# Patient Record
Sex: FEMALE | NOT HISPANIC OR LATINO | Employment: UNEMPLOYED | URBAN - METROPOLITAN AREA
[De-identification: names, ages, dates, MRNs, and addresses within clinical notes are randomized per-mention and may not be internally consistent; named-entity substitution may affect disease eponyms.]

---

## 2022-05-03 ENCOUNTER — HOSPITAL ENCOUNTER (INPATIENT)
Facility: HOSPITAL | Age: 72
LOS: 1 days | Discharge: HOME | DRG: 280 | End: 2022-05-04
Attending: STUDENT IN AN ORGANIZED HEALTH CARE EDUCATION/TRAINING PROGRAM | Admitting: INTERNAL MEDICINE
Payer: MEDICARE

## 2022-05-03 DIAGNOSIS — T78.40XA ALLERGIC REACTION, INITIAL ENCOUNTER: Primary | ICD-10-CM

## 2022-05-03 DIAGNOSIS — I25.10 CAD (CORONARY ARTERY DISEASE): ICD-10-CM

## 2022-05-03 DIAGNOSIS — R06.02 SOB (SHORTNESS OF BREATH): ICD-10-CM

## 2022-05-03 PROBLEM — T50.8X5A CONTRAST MEDIA ADVERSE REACTION: Status: ACTIVE | Noted: 2022-05-03

## 2022-05-03 PROBLEM — Z91.041 CONTRAST MEDIA ALLERGY: Chronic | Status: ACTIVE | Noted: 2022-05-03

## 2022-05-03 PROBLEM — Z91.041 CONTRAST MEDIA ALLERGY: Status: ACTIVE | Noted: 2022-05-03

## 2022-05-03 LAB
ALBUMIN SERPL-MCNC: 2.5 GM/DL (ref 3.4–4.8)
ALBUMIN SERPL-MCNC: 3.8 GM/DL (ref 3.4–4.8)
ALBUMIN/GLOB SERPL: 1.3 RATIO (ref 1.1–2)
ALP SERPL-CCNC: 49 UNIT/L (ref 40–150)
ALT SERPL-CCNC: 13 UNIT/L (ref 0–55)
ANION GAP SERPL CALC-SCNC: 14 MEQ/L
AST SERPL-CCNC: 16 UNIT/L (ref 5–34)
BASOPHILS # BLD AUTO: 0.01 X10(3)/MCL (ref 0–0.2)
BASOPHILS NFR BLD AUTO: 0.1 %
BILIRUBIN DIRECT+TOT PNL SERPL-MCNC: 0.1 MG/DL (ref 0–0.5)
BILIRUBIN DIRECT+TOT PNL SERPL-MCNC: 0.2 MG/DL (ref 0–0.8)
BILIRUBIN DIRECT+TOT PNL SERPL-MCNC: 0.3 MG/DL
BNP BLD-MCNC: 84.7 PG/ML
BUN SERPL-MCNC: 13.1 MG/DL (ref 9.8–20.1)
BUN SERPL-MCNC: 9 MG/DL (ref 9.8–20.1)
CALCIUM SERPL-MCNC: 6.2 MG/DL (ref 8.4–10.2)
CALCIUM SERPL-MCNC: 9.4 MG/DL (ref 8.4–10.2)
CHLORIDE SERPL-SCNC: 104 MMOL/L (ref 98–107)
CHLORIDE SERPL-SCNC: 118 MMOL/L (ref 98–107)
CK MB SERPL-MCNC: 3.7 NG/ML
CK MB SERPL-MCNC: 4.5 NG/ML
CK SERPL-CCNC: 146 U/L (ref 29–168)
CK SERPL-CCNC: 148 U/L (ref 29–168)
CO2 SERPL-SCNC: 20 MMOL/L (ref 23–31)
CO2 SERPL-SCNC: 23 MMOL/L (ref 23–31)
CREAT SERPL-MCNC: 0.67 MG/DL (ref 0.55–1.02)
CREAT SERPL-MCNC: 1.14 MG/DL (ref 0.55–1.02)
CREAT UR-MCNC: 38.6 MG/DL (ref 47–110)
CREAT/UREA NIT SERPL: 11
DEPRECATED CALCIDIOL+CALCIFEROL SERPL-MC: 43.7 NG/ML (ref 30–80)
EOSINOPHIL # BLD AUTO: 0 X10(3)/MCL (ref 0–0.9)
EOSINOPHIL NFR BLD AUTO: 0 %
ERYTHROCYTE [DISTWIDTH] IN BLOOD BY AUTOMATED COUNT: 13.4 % (ref 11.5–17)
GLOBULIN SER-MCNC: 1.9 GM/DL (ref 2.4–3.5)
GLUCOSE SERPL-MCNC: 118 MG/DL (ref 82–115)
GLUCOSE SERPL-MCNC: 226 MG/DL (ref 82–115)
GLUCOSE SERPL-MCNC: 288 MG/DL (ref 70–110)
HCT VFR BLD AUTO: 35.5 % (ref 37–47)
HGB BLD-MCNC: 11.1 GM/DL (ref 12–16)
IMM GRANULOCYTES # BLD AUTO: 0.05 X10(3)/MCL (ref 0–0.02)
IMM GRANULOCYTES NFR BLD AUTO: 0.5 % (ref 0–0.43)
LYMPHOCYTES # BLD AUTO: 0.91 X10(3)/MCL (ref 0.6–4.6)
LYMPHOCYTES NFR BLD AUTO: 8.3 %
MAGNESIUM SERPL-MCNC: 2.3 MG/DL (ref 1.6–2.6)
MCH RBC QN AUTO: 31.6 PG (ref 27–31)
MCHC RBC AUTO-ENTMCNC: 31.3 MG/DL (ref 33–36)
MCV RBC AUTO: 101.1 FL (ref 80–94)
MONOCYTES # BLD AUTO: 0.35 X10(3)/MCL (ref 0.1–1.3)
MONOCYTES NFR BLD AUTO: 3.2 %
NEUTROPHILS # BLD AUTO: 9.7 X10(3)/MCL (ref 2.1–9.2)
NEUTROPHILS NFR BLD AUTO: 87.9 %
NRBC BLD AUTO-RTO: 0 %
PHOSPHATE SERPL-MCNC: 3 MG/DL (ref 2.3–4.7)
PLATELET # BLD AUTO: 169 X10(3)/MCL (ref 130–400)
PMV BLD AUTO: 10.3 FL (ref 9.4–12.4)
POCT GLUCOSE: 288 MG/DL (ref 70–110)
POTASSIUM SERPL-SCNC: 2.8 MMOL/L (ref 3.5–5.1)
POTASSIUM SERPL-SCNC: 4.3 MMOL/L (ref 3.5–5.1)
PROT SERPL-MCNC: 4.4 GM/DL (ref 5.8–7.6)
PROT UR STRIP-MCNC: 12.4 MG/DL
PROT/CREAT UR-RTO: 321.2 MG/GM CR
PTH-INTACT SERPL-MCNC: 62.7 PG/ML (ref 8.7–77)
RBC # BLD AUTO: 3.51 X10(6)/MCL (ref 4.2–5.4)
SODIUM SERPL-SCNC: 141 MMOL/L (ref 136–145)
SODIUM SERPL-SCNC: 146 MMOL/L (ref 136–145)
TROPONIN I SERPL-MCNC: 0.09 NG/ML (ref 0–0.04)
TROPONIN I SERPL-MCNC: 0.6 NG/ML (ref 0–0.04)
TROPONIN I SERPL-MCNC: 0.65 NG/ML (ref 0–0.04)
WBC # SPEC AUTO: 11 X10(3)/MCL (ref 4.5–11.5)

## 2022-05-03 PROCEDURE — 85025 COMPLETE CBC W/AUTO DIFF WBC: CPT | Performed by: STUDENT IN AN ORGANIZED HEALTH CARE EDUCATION/TRAINING PROGRAM

## 2022-05-03 PROCEDURE — 80069 RENAL FUNCTION PANEL: CPT | Performed by: INTERNAL MEDICINE

## 2022-05-03 PROCEDURE — 25000003 PHARM REV CODE 250: Performed by: STUDENT IN AN ORGANIZED HEALTH CARE EDUCATION/TRAINING PROGRAM

## 2022-05-03 PROCEDURE — 36415 COLL VENOUS BLD VENIPUNCTURE: CPT | Performed by: STUDENT IN AN ORGANIZED HEALTH CARE EDUCATION/TRAINING PROGRAM

## 2022-05-03 PROCEDURE — 93010 ELECTROCARDIOGRAM REPORT: CPT | Mod: ,,, | Performed by: INTERNAL MEDICINE

## 2022-05-03 PROCEDURE — 11000001 HC ACUTE MED/SURG PRIVATE ROOM

## 2022-05-03 PROCEDURE — 93005 ELECTROCARDIOGRAM TRACING: CPT

## 2022-05-03 PROCEDURE — 84484 ASSAY OF TROPONIN QUANT: CPT | Performed by: INTERNAL MEDICINE

## 2022-05-03 PROCEDURE — 84484 ASSAY OF TROPONIN QUANT: CPT | Performed by: STUDENT IN AN ORGANIZED HEALTH CARE EDUCATION/TRAINING PROGRAM

## 2022-05-03 PROCEDURE — 25000003 PHARM REV CODE 250: Performed by: INTERNAL MEDICINE

## 2022-05-03 PROCEDURE — 82306 VITAMIN D 25 HYDROXY: CPT | Performed by: INTERNAL MEDICINE

## 2022-05-03 PROCEDURE — 25000242 PHARM REV CODE 250 ALT 637 W/ HCPCS: Performed by: STUDENT IN AN ORGANIZED HEALTH CARE EDUCATION/TRAINING PROGRAM

## 2022-05-03 PROCEDURE — 83880 ASSAY OF NATRIURETIC PEPTIDE: CPT | Performed by: STUDENT IN AN ORGANIZED HEALTH CARE EDUCATION/TRAINING PROGRAM

## 2022-05-03 PROCEDURE — 94640 AIRWAY INHALATION TREATMENT: CPT

## 2022-05-03 PROCEDURE — 63600175 PHARM REV CODE 636 W HCPCS: Performed by: INTERNAL MEDICINE

## 2022-05-03 PROCEDURE — 63600175 PHARM REV CODE 636 W HCPCS: Performed by: STUDENT IN AN ORGANIZED HEALTH CARE EDUCATION/TRAINING PROGRAM

## 2022-05-03 PROCEDURE — 82553 CREATINE MB FRACTION: CPT | Performed by: INTERNAL MEDICINE

## 2022-05-03 PROCEDURE — 83735 ASSAY OF MAGNESIUM: CPT | Performed by: INTERNAL MEDICINE

## 2022-05-03 PROCEDURE — 99285 EMERGENCY DEPT VISIT HI MDM: CPT | Mod: 25

## 2022-05-03 PROCEDURE — 83970 ASSAY OF PARATHORMONE: CPT | Performed by: INTERNAL MEDICINE

## 2022-05-03 PROCEDURE — 82570 ASSAY OF URINE CREATININE: CPT | Performed by: INTERNAL MEDICINE

## 2022-05-03 PROCEDURE — 80053 COMPREHEN METABOLIC PANEL: CPT | Performed by: STUDENT IN AN ORGANIZED HEALTH CARE EDUCATION/TRAINING PROGRAM

## 2022-05-03 PROCEDURE — 93010 EKG 12-LEAD: ICD-10-PCS | Mod: ,,, | Performed by: INTERNAL MEDICINE

## 2022-05-03 PROCEDURE — 82550 ASSAY OF CK (CPK): CPT | Performed by: INTERNAL MEDICINE

## 2022-05-03 RX ORDER — AMLODIPINE BESYLATE 5 MG/1
10 TABLET ORAL DAILY
Status: DISCONTINUED | OUTPATIENT
Start: 2022-05-04 | End: 2022-05-04 | Stop reason: HOSPADM

## 2022-05-03 RX ORDER — METHYLPREDNISOLONE SOD SUCC 125 MG
80 VIAL (EA) INJECTION EVERY 6 HOURS
Status: DISCONTINUED | OUTPATIENT
Start: 2022-05-03 | End: 2022-05-03

## 2022-05-03 RX ORDER — CARVEDILOL 25 MG/1
25 TABLET ORAL 2 TIMES DAILY WITH MEALS
COMMUNITY

## 2022-05-03 RX ORDER — TRAZODONE HYDROCHLORIDE 50 MG/1
50 TABLET ORAL NIGHTLY
Status: DISCONTINUED | OUTPATIENT
Start: 2022-05-03 | End: 2022-05-04 | Stop reason: HOSPADM

## 2022-05-03 RX ORDER — PANTOPRAZOLE SODIUM 40 MG/1
40 TABLET, DELAYED RELEASE ORAL DAILY
Status: DISCONTINUED | OUTPATIENT
Start: 2022-05-04 | End: 2022-05-04 | Stop reason: HOSPADM

## 2022-05-03 RX ORDER — INSULIN ASPART 100 [IU]/ML
0-5 INJECTION, SOLUTION INTRAVENOUS; SUBCUTANEOUS
Status: DISCONTINUED | OUTPATIENT
Start: 2022-05-03 | End: 2022-05-04 | Stop reason: HOSPADM

## 2022-05-03 RX ORDER — NITROGLYCERIN 0.4 MG/1
0.4 TABLET SUBLINGUAL EVERY 5 MIN PRN
COMMUNITY

## 2022-05-03 RX ORDER — IBUPROFEN 200 MG
24 TABLET ORAL
Status: DISCONTINUED | OUTPATIENT
Start: 2022-05-03 | End: 2022-05-04 | Stop reason: HOSPADM

## 2022-05-03 RX ORDER — ASPIRIN 81 MG/1
81 TABLET ORAL DAILY
Status: DISCONTINUED | OUTPATIENT
Start: 2022-05-04 | End: 2022-05-04 | Stop reason: HOSPADM

## 2022-05-03 RX ORDER — ENOXAPARIN SODIUM 100 MG/ML
40 INJECTION SUBCUTANEOUS
Status: DISCONTINUED | OUTPATIENT
Start: 2022-05-04 | End: 2022-05-03

## 2022-05-03 RX ORDER — MAGNESIUM SULFATE HEPTAHYDRATE 40 MG/ML
2 INJECTION, SOLUTION INTRAVENOUS
Status: COMPLETED | OUTPATIENT
Start: 2022-05-03 | End: 2022-05-03

## 2022-05-03 RX ORDER — CLOPIDOGREL BISULFATE 75 MG/1
75 TABLET ORAL DAILY
COMMUNITY

## 2022-05-03 RX ORDER — FUROSEMIDE 10 MG/ML
20 INJECTION INTRAMUSCULAR; INTRAVENOUS
Status: COMPLETED | OUTPATIENT
Start: 2022-05-03 | End: 2022-05-03

## 2022-05-03 RX ORDER — ENOXAPARIN SODIUM 100 MG/ML
1 INJECTION SUBCUTANEOUS
Status: DISCONTINUED | OUTPATIENT
Start: 2022-05-03 | End: 2022-05-03

## 2022-05-03 RX ORDER — IPRATROPIUM BROMIDE AND ALBUTEROL SULFATE 2.5; .5 MG/3ML; MG/3ML
3 SOLUTION RESPIRATORY (INHALATION)
Status: DISPENSED | OUTPATIENT
Start: 2022-05-03 | End: 2022-05-04

## 2022-05-03 RX ORDER — POTASSIUM CHLORIDE 20 MEQ/1
40 TABLET, EXTENDED RELEASE ORAL ONCE
Status: COMPLETED | OUTPATIENT
Start: 2022-05-03 | End: 2022-05-03

## 2022-05-03 RX ORDER — DIPHENHYDRAMINE HYDROCHLORIDE 50 MG/ML
12.5 INJECTION INTRAMUSCULAR; INTRAVENOUS EVERY 6 HOURS
Status: DISCONTINUED | OUTPATIENT
Start: 2022-05-03 | End: 2022-05-03

## 2022-05-03 RX ORDER — ATORVASTATIN CALCIUM 80 MG/1
80 TABLET, FILM COATED ORAL DAILY
COMMUNITY

## 2022-05-03 RX ORDER — ASPIRIN 81 MG/1
81 TABLET ORAL DAILY
COMMUNITY

## 2022-05-03 RX ORDER — ENOXAPARIN SODIUM 100 MG/ML
1 INJECTION SUBCUTANEOUS
Status: DISCONTINUED | OUTPATIENT
Start: 2022-05-04 | End: 2022-05-04 | Stop reason: HOSPADM

## 2022-05-03 RX ORDER — ATORVASTATIN CALCIUM 40 MG/1
80 TABLET, FILM COATED ORAL DAILY
Status: DISCONTINUED | OUTPATIENT
Start: 2022-05-04 | End: 2022-05-04 | Stop reason: HOSPADM

## 2022-05-03 RX ORDER — IPRATROPIUM BROMIDE AND ALBUTEROL SULFATE 2.5; .5 MG/3ML; MG/3ML
3 SOLUTION RESPIRATORY (INHALATION) ONCE
Status: COMPLETED | OUTPATIENT
Start: 2022-05-03 | End: 2022-05-03

## 2022-05-03 RX ORDER — POTASSIUM CHLORIDE 14.9 MG/ML
40 INJECTION INTRAVENOUS ONCE
Status: COMPLETED | OUTPATIENT
Start: 2022-05-03 | End: 2022-05-03

## 2022-05-03 RX ORDER — POTASSIUM CHLORIDE 14.9 MG/ML
20 INJECTION INTRAVENOUS ONCE
Status: DISCONTINUED | OUTPATIENT
Start: 2022-05-03 | End: 2022-05-03

## 2022-05-03 RX ORDER — HYDROGEN PEROXIDE 3 %
20 SOLUTION, NON-ORAL MISCELLANEOUS
COMMUNITY

## 2022-05-03 RX ORDER — CARVEDILOL 12.5 MG/1
25 TABLET ORAL 2 TIMES DAILY WITH MEALS
Status: DISCONTINUED | OUTPATIENT
Start: 2022-05-03 | End: 2022-05-03

## 2022-05-03 RX ORDER — GLUCAGON 1 MG
1 KIT INJECTION
Status: DISCONTINUED | OUTPATIENT
Start: 2022-05-03 | End: 2022-05-04 | Stop reason: HOSPADM

## 2022-05-03 RX ORDER — TRAZODONE HYDROCHLORIDE 50 MG/1
50 TABLET ORAL NIGHTLY
COMMUNITY

## 2022-05-03 RX ORDER — METFORMIN HYDROCHLORIDE 500 MG/1
500 TABLET ORAL
COMMUNITY

## 2022-05-03 RX ORDER — EZETIMIBE 10 MG/1
10 TABLET ORAL DAILY
Status: DISCONTINUED | OUTPATIENT
Start: 2022-05-04 | End: 2022-05-04 | Stop reason: HOSPADM

## 2022-05-03 RX ORDER — ERGOCALCIFEROL 1.25 MG/1
50000 CAPSULE ORAL
COMMUNITY

## 2022-05-03 RX ORDER — CLOPIDOGREL BISULFATE 75 MG/1
75 TABLET ORAL DAILY
Status: DISCONTINUED | OUTPATIENT
Start: 2022-05-04 | End: 2022-05-04 | Stop reason: HOSPADM

## 2022-05-03 RX ORDER — IBUPROFEN 200 MG
16 TABLET ORAL
Status: DISCONTINUED | OUTPATIENT
Start: 2022-05-03 | End: 2022-05-04 | Stop reason: HOSPADM

## 2022-05-03 RX ORDER — LISINOPRIL AND HYDROCHLOROTHIAZIDE 12.5; 2 MG/1; MG/1
1 TABLET ORAL 2 TIMES DAILY
COMMUNITY

## 2022-05-03 RX ORDER — AMLODIPINE BESYLATE 10 MG/1
10 TABLET ORAL DAILY
COMMUNITY

## 2022-05-03 RX ORDER — LEVOTHYROXINE SODIUM 137 UG/1
1 CAPSULE ORAL DAILY
Status: DISCONTINUED | OUTPATIENT
Start: 2022-05-04 | End: 2022-05-03

## 2022-05-03 RX ORDER — LEVOTHYROXINE SODIUM 137 UG/1
1 CAPSULE ORAL DAILY
COMMUNITY

## 2022-05-03 RX ORDER — EZETIMIBE 10 MG/1
10 TABLET ORAL DAILY
COMMUNITY

## 2022-05-03 RX ADMIN — TRAZODONE HYDROCHLORIDE 50 MG: 50 TABLET ORAL at 10:05

## 2022-05-03 RX ADMIN — ENOXAPARIN SODIUM 60 MG: 100 INJECTION SUBCUTANEOUS at 01:05

## 2022-05-03 RX ADMIN — POTASSIUM CHLORIDE 40 MEQ: 200 INJECTION, SOLUTION INTRAVENOUS at 03:05

## 2022-05-03 RX ADMIN — CALCIUM GLUCONATE 1 G: 98 INJECTION, SOLUTION INTRAVENOUS at 02:05

## 2022-05-03 RX ADMIN — POTASSIUM CHLORIDE 40 MEQ: 1500 TABLET, EXTENDED RELEASE ORAL at 08:05

## 2022-05-03 RX ADMIN — POTASSIUM CHLORIDE 40 MEQ: 20 TABLET, EXTENDED RELEASE ORAL at 01:05

## 2022-05-03 RX ADMIN — FUROSEMIDE 20 MG: 10 INJECTION, SOLUTION INTRAMUSCULAR; INTRAVENOUS at 01:05

## 2022-05-03 RX ADMIN — MAGNESIUM SULFATE 2 G: 2 INJECTION INTRAVENOUS at 01:05

## 2022-05-03 RX ADMIN — METHYLPREDNISOLONE SODIUM SUCCINATE 80 MG: 40 INJECTION, POWDER, FOR SOLUTION INTRAMUSCULAR; INTRAVENOUS at 10:05

## 2022-05-03 RX ADMIN — IPRATROPIUM BROMIDE AND ALBUTEROL SULFATE 3 ML: .5; 3 SOLUTION RESPIRATORY (INHALATION) at 01:05

## 2022-05-03 NOTE — ED PROVIDER NOTES
Encounter Date: 5/3/2022    SCRIBE #1 NOTE: I, Soo Marcos, am scribing for, and in the presence of,  Flaco Raza MD. I have scribed the following portions of the note - the EKG reading. Other sections scribed: HPI, ROS, and physical examination.       History     Chief Complaint   Patient presents with    Allergic Reaction     Pt was having allergic reaction to contrast.  Pt given Valium 5mg, Versed 1mg, Fentanyl 50mcg, Pepcid 20mg, Benadryl 100mg, Solumedrol 250mg, Epi x 1, Atropine x 1, Nitro 900mcg, Zofran 8mg - vital signs were stable when EMS arrived.     Hypotension     Pt was having cardiac cath - was given dye and went bradycardic, hypotensive.      71 y.o. female presents to ED via EMS from [facility] after having a possible allergic reaction to dye during heart cath procedure. Per EMS report pt became hypotensive and bradycardic after dye was administered. She was given Valium 5 mg, Versed 1 mg, Fentanyl 50 mcg, Pepcid 20 mg, Benadryl 100 mg, Solumedrol 250 mg, epi x 1, atropine x 1, NTG, and Zofran. Vitals stable upon EMS arrival. Pt reports having symptoms of chest pain, SOB, nausea, and vomiting since episode occurred.    The history is provided by the patient and the EMS personnel. No  was used.   Allergic Reaction  The primary symptoms are  shortness of breath, nausea and vomiting. Primary symptoms comment: bradycardic, hypotensive. The current episode started 1 to 2 hours ago. The problem has been rapidly improving.   The shortness of breath began today. The shortness of breath developed suddenly.   Associated with: being given dye during heart cath procedure.     Review of patient's allergies indicates:   Allergen Reactions    Iodinated contrast media Other (See Comments)     Became bradycardic, hypotensive during angiogram contrast administration.     Past Medical History:   Diagnosis Date    Coronary artery disease     Diabetes mellitus     Hypertension     ST elevation  (STEMI) myocardial infarction of unspecified site      History reviewed. No pertinent surgical history.  History reviewed. No pertinent family history.     Review of Systems   Constitutional: Negative.    HENT: Negative.    Eyes: Negative.    Respiratory: Positive for shortness of breath.    Cardiovascular: Positive for chest pain.   Gastrointestinal: Positive for nausea and vomiting.   Endocrine: Negative.    Genitourinary: Negative.    Musculoskeletal: Negative.    Skin: Negative.    Allergic/Immunologic:        Allergic reaction to contrast dye   Neurological: Negative.    Hematological: Negative.    Psychiatric/Behavioral: Negative.    All other systems reviewed and are negative.      Physical Exam     Initial Vitals [05/03/22 1015]   BP Pulse Resp Temp SpO2   (!) 130/51 89 18 98.8 °F (37.1 °C) 100 %      MAP       --         Physical Exam    Nursing note and vitals reviewed.  Constitutional: She appears well-developed. She does not appear ill. No distress.   HENT:   Head: Normocephalic and atraumatic.   Mouth/Throat: Oropharynx is clear and moist.   Eyes: Conjunctivae and EOM are normal.   Neck: Neck supple.   Normal range of motion.  Cardiovascular: Normal rate and regular rhythm.   No murmur heard.  Arterial line to RUE   Pulmonary/Chest: Breath sounds normal. No respiratory distress. She exhibits no tenderness.   Abdominal: Abdomen is soft. Bowel sounds are normal. She exhibits no distension. There is no abdominal tenderness.   No right CVA tenderness.  No left CVA tenderness.   Musculoskeletal:         General: Normal range of motion.      Cervical back: Normal range of motion and neck supple.      Lumbar back: Normal. Normal range of motion.     Neurological: She is alert and oriented to person, place, and time. She has normal strength. No cranial nerve deficit or sensory deficit.   Psychiatric: She has a normal mood and affect. Her mood appears not anxious.         ED Course   Procedures  Labs Reviewed    COMPREHENSIVE METABOLIC PANEL - Abnormal; Notable for the following components:       Result Value    Sodium Level 146 (*)     Potassium Level 2.8 (*)     Chloride 118 (*)     Carbon Dioxide 20 (*)     Glucose Level 118 (*)     Blood Urea Nitrogen 9.0 (*)     Calcium Level Total 6.2 (*)     Protein Total 4.4 (*)     Albumin Level 2.5 (*)     Globulin 1.9 (*)     All other components within normal limits   TROPONIN I - Abnormal; Notable for the following components:    Troponin-I 0.089 (*)     All other components within normal limits   CBC WITH DIFFERENTIAL - Abnormal; Notable for the following components:    RBC 3.51 (*)     Hgb 11.1 (*)     Hct 35.5 (*)     .1 (*)     MCH 31.6 (*)     MCHC 31.3 (*)     Neut # 9.7 (*)     IG# 0.05 (*)     IG% 0.5 (*)     All other components within normal limits   B-TYPE NATRIURETIC PEPTIDE - Normal   CBC W/ AUTO DIFFERENTIAL    Narrative:     The following orders were created for panel order CBC auto differential.  Procedure                               Abnormality         Status                     ---------                               -----------         ------                     CBC with Differential[[id number]]        Abnormal            Final result                 Please view results for these tests on the individual orders.     EKG Readings: (Independently Interpreted)   Initial Reading: No STEMI. Rhythm: Normal Sinus Rhythm. Heart Rate: 89. Ectopy: No Ectopy. Conduction: Normal. ST Segments: Normal ST Segments. T Waves: Normal. Axis: Normal.   EKG performed at 1017 on 5/3/22     ECG Results          EKG 12-lead (Final result)  Result time 05/03/22 16:53:08    Final result by Interface, Lab In [facility] (05/03/22 16:53:08)                 Narrative:    Test Reason : I25.10,    Vent. Rate : 089 BPM     Atrial Rate : 089 BPM     P-R Int : 178 ms          QRS Dur : 078 ms      QT Int : 396 ms       P-R-T Axes : 079 073 079 degrees     QTc Int : 481 ms    Normal sinus  rhythm  Normal ECG  Confirmed by Porter Diaz MD ([id number]) on 5/3/2022 4:53:01 PM    Referred By: KAREN PEÑA           Confirmed By:Porter Diaz MD                             EKG 12-LEAD (Final result)  Result time 05/19/22 14:10:26    Final result by Unknown User (05/19/22 14:10:26)                                Imaging Results          X-Ray Chest 1 View (Final result)  Result time 05/03/22 13:16:07    Final result by Sam Day MD (05/03/22 13:16:07)                 Impression:      NO ACUTE CARDIOPULMONARY PROCESS IDENTIFIED.      Electronically signed by: Sam Day  Date:    05/03/2022  Time:    13:16             Narrative:    EXAMINATION:  XR CHEST 1 VIEW    CLINICAL HISTORY:  Shortness of breath    COMPARISON:  None available.    FINDINGS:  Cardiopericardial silhouette is within normal limits. Lungs are without dense focal or segmental consolidation, congestive process, pleural effusions or pneumothorax.                                 Medications   albuterol-ipratropium 2.5 mg-0.5 mg/3 mL nebulizer solution 3 mL ( Nebulization Canceled Entry 5/3/22 1245)   albuterol-ipratropium 2.5 mg-0.5 mg/3 mL nebulizer solution 3 mL (3 mLs Nebulization Given 5/3/22 1337)   furosemide injection 20 mg (20 mg Intravenous Given 5/3/22 1359)   magnesium sulfate 2g in water 50mL IVPB (premix) (0 g Intravenous Stopped 5/3/22 1559)   potassium chloride SA CR tablet 40 mEq (40 mEq Oral Given 5/3/22 1359)   potassium chloride 20 mEq in 100 mL IVPB (FOR CENTRAL LINE ADMINISTRATION ONLY) ( Intravenous Rate/Dose Change 5/3/22 1600)   calcium gluconate 1 g in sodium chloride 0.9 % 100 mL IVPB (MB+) (0 g Intravenous Stopped 5/3/22 1519)   potassium chloride SA CR tablet 40 mEq (40 mEq Oral Given 5/3/22 2002)              Scribe Attestation:   Scribe #1: I performed the above scribed service and the documentation accurately describes the services I performed. I attest to the accuracy of the note.        ED Course as  of 05/19/22 1748   Tue May 03, 2022   1256 Spoke with Dr. Gregor Camacho.  Recommend Lovenox 1 per kg, Solu-Medrol Q 6, Benadryl q.6.  Will admit. [RP]   1258 All results discussed with patient.  Patient remains on 3 L nasal cannula.  Still has wheezing.  Will give additional DuoNeb.  Will admit for continued observation.  Patient verbalized understanding agreed to plan. [RP]      ED Course User Index  [RP] Flaco Raza MD            I, Flaco Raza MD, personally performed the services described in the documentation as documented by the scribe in my presence and is both accurate and complete.       Discussed with medicine for admission.      Clinical Impression:   Final diagnoses:  [R06.02] SOB (shortness of breath)  [I25.10] CAD (coronary artery disease)  [T78.40XA] Allergic reaction, initial encounter (Primary)          ED Disposition Condition    Admit               Flaco Raza MD  05/19/22 1744

## 2022-05-03 NOTE — HPI
This is a  yo AAF with a h/o CAD/PTCA/MI/PAD, well known to me and having atypical anginal symptoms despite a normal NMST.  While undergoing LHC at the out-patient cath lab, it appeared that patient developed a severe contrast dye allergy where coronary flow drastically slowed down after the first couple injections of contrast during the angiogram.  She has a history of contrast dye allergy and was premedicated overnight and today for the procedure.  During the angiogram, she developed severe cp, SOB and ischemic EKG changes as well as hypotension and bradycardia.  She was quickly resuscitated using NTG, Epi and Atropine.  She was also administered another round of IV Solumedrol.  Transferred via ambulance to the ED for further eval and management.  Currently, she is feeling mostly back to normal and chest pain free but still with some SOB and O2 at 2 l/m.

## 2022-05-03 NOTE — ED NOTES
Pt presents to ED from outpatient facility while having heart cath. Per [facility] patient became bradycardic and hypotensive after receiving contrast intravenously. See triage note for mulitple medications given. Pt AAOx4 at this time, denies pain. O2 89% on room air, put on 3L NC, up to 93%. Denies needs, placed on monitor, grandson at bedside, side rails up x2, cb within reach, nicolasa schultz

## 2022-05-03 NOTE — SUBJECTIVE & OBJECTIVE
No past medical history on file.    No past surgical history on file.    Review of patient's allergies indicates:   Allergen Reactions    Iodinated contrast media Other (See Comments)     Became bradycardic, hypotensive during angiogram contrast administration.       No current facility-administered medications on file prior to encounter.     Current Outpatient Medications on File Prior to Encounter   Medication Sig    amLODIPine (NORVASC) 10 MG tablet Take 10 mg by mouth once daily.    aspirin (ECOTRIN) 81 MG EC tablet Take 81 mg by mouth once daily.    atorvastatin (LIPITOR) 80 MG tablet Take 80 mg by mouth once daily.    carvediloL (COREG) 25 MG tablet Take 25 mg by mouth 2 (two) times daily with meals.    clopidogreL (PLAVIX) 75 mg tablet Take 75 mg by mouth once daily.    ergocalciferol (VITAMIN D2) 50,000 unit Cap Take 50,000 Units by mouth twice a week.    esomeprazole (NEXIUM) 20 MG capsule Take 20 mg by mouth before breakfast.    ezetimibe (ZETIA) 10 mg tablet Take 10 mg by mouth once daily.    levothyroxine (TIROSINT) 137 mcg Cap Take 1 tablet by mouth once daily at 6am.    linaCLOtide (LINZESS) 145 mcg Cap capsule Take 145 mcg by mouth before breakfast.    lisinopriL-hydrochlorothiazide (PRINZIDE,ZESTORETIC) 20-12.5 mg per tablet Take 1 tablet by mouth 2 (two) times a day.    metFORMIN (GLUCOPHAGE) 500 MG tablet Take 500 mg by mouth daily with breakfast.    traZODone (DESYREL) 50 MG tablet Take 50 mg by mouth every evening.    nitroGLYCERIN (NITROSTAT) 0.4 MG SL tablet Place 0.4 mg under the tongue every 5 (five) minutes as needed for Chest pain.     Family History    None       Tobacco Use    Smoking status: Not on file    Smokeless tobacco: Not on file   Substance and Sexual Activity    Alcohol use: Not on file    Drug use: Not on file    Sexual activity: Not on file     Review of Systems   Cardiovascular:  Positive for chest pain.   Respiratory:  Positive for shortness of breath.    All other systems  reviewed and are negative.  Objective:     Vital Signs (Most Recent):  Temp: 98.8 °F (37.1 °C) (05/03/22 1015)  Pulse: (!) 52 (05/03/22 1620)  Resp: 20 (05/03/22 1620)  BP: 130/72 (05/03/22 1620)  SpO2: 96 % (05/03/22 1620)   Vital Signs (24h Range):  Temp:  [98.8 °F (37.1 °C)] 98.8 °F (37.1 °C)  Pulse:  [52-89] 52  Resp:  [15-22] 20  SpO2:  [93 %-100 %] 96 %  BP: (126-145)/(51-75) 130/72     Weight: 63.5 kg (139 lb 15.9 oz)  Body mass index is 23.3 kg/m².    SpO2: 96 %  O2 Device (Oxygen Therapy): nasal cannula      Intake/Output Summary (Last 24 hours) at 5/3/2022 1801  Last data filed at 5/3/2022 1616  Gross per 24 hour   Intake --   Output 1000 ml   Net -1000 ml       Lines/Drains/Airways       Arterial Line  Duration             Arterial Line 05/03/22 1115 Right Radial <1 day              Peripheral Intravenous Line  Duration                  Peripheral IV - Single Lumen 05/03/22 1115 22 G Left Hand <1 day         Peripheral IV - Single Lumen 05/03/22 1130 20 G Left Antecubital <1 day                    Physical Exam  Cardiovascular:      Rate and Rhythm: Regular rhythm.      Pulses: Normal pulses.      Heart sounds: Normal heart sounds.       Significant Labs: All pertinent lab results from the last 24 hours have been reviewed. and   Recent Lab Results         05/03/22  1603   05/03/22  1130        Abs Lymph   0.91       Abs Neutro   9.7       Albumin   2.5       Albumin/Globulin Ratio   1.3       Alkaline Phosphatase   49       ALT   13       AST   16       Baso #   0.01       Basophil %   0.1       Bilirubin, Direct   0.1       Bilirubin, Indirect   0.20       BILIRUBIN TOTAL   0.3       BNP   84.7       BUN   9.0       Calcium   6.2  Comment: Critical Result called and verified by verbal readback to: Hamida Guardado on 05/03/2022 at 12:56. Reported by [id number].       Chloride   118       CO2   20       Creatinine   0.67       Creatinine, Urine 38.6         eGFR if    >60       eGFR if non     >60       Eos #   0.00       Eosinophil %   0.0       Globulin, Total   1.9       Glucose   118       Hematocrit   35.5       Hemoglobin   11.1       Immature Grans (Abs)   0.05       Immature Granulocytes   0.5       Lymph Auto   8.3       MCH   31.6       MCHC   31.3       MCV   101.1       Mono #   0.35       Mono %   3.2       MPV   10.3       Neutrophils Relative   87.9       nRBC   0.0       Platelets   169       Potassium   2.8       Prot/Creat Ratio, Urine 321.2         PROTEIN TOTAL   4.4       Protein, Urine Random 12.4         RBC   3.51       RDW   13.4       Sodium   146       Troponin I   0.089       WBC   11.0               Significant Imaging: EKG: NSR, NL ST segments currently and no ischemic EKG changes. and X-Ray: CXR: X-Ray Chest 1 View (CXR):   Results for orders placed or performed during the hospital encounter of 05/03/22   X-Ray Chest 1 View    Narrative    EXAMINATION:  XR CHEST 1 VIEW    CLINICAL HISTORY:  Shortness of breath    COMPARISON:  None available.    FINDINGS:  Cardiopericardial silhouette is within normal limits. Lungs are without dense focal or segmental consolidation, congestive process, pleural effusions or pneumothorax.      Impression    NO ACUTE CARDIOPULMONARY PROCESS IDENTIFIED.      Electronically signed by: Sam Day  Date:    05/03/2022  Time:    13:16

## 2022-05-03 NOTE — CONSULTS
[facility] - 9th Floor Med Surg  Cardiology  Consult Note    Patient Name: Dian Malik  MRN: [id number]  Admission Date: 5/3/2022  Hospital Length of Stay: 0 days  Code Status: No Order   Attending Provider: Eddy Raza MD   Consulting Provider: Gregor Rosales DO  Primary Care Physician: Rachna Domingo MD  Principal Problem:Contrast media adverse reaction    Patient information was obtained from patient and ER records.     Inpatient consult to Cardiology  Consult performed by: Gregor Camacho DO  Consult ordered by: Flaco Raza MD        Subjective:     Chief Complaint:  Chest pain.      HPI:   This is a  yo AAF with a h/o CAD/PTCA/MI/PAD, well known to me and having atypical anginal symptoms despite a normal NMST.  While undergoing LHC at the out-patient cath lab, it appeared that patient developed a severe contrast dye allergy where coronary flow drastically slowed down after the first couple injections of contrast during the angiogram.  She has a history of contrast dye allergy and was premedicated overnight and today for the procedure.  During the angiogram, she developed severe cp, SOB and ischemic EKG changes as well as hypotension and bradycardia.  She was quickly resuscitated using NTG, Epi and Atropine.  She was also administered another round of IV Solumedrol.  Transferred via ambulance to the ED for further eval and management.  Currently, she is feeling mostly back to normal and chest pain free but still with some SOB and O2 at 2 l/m.         No past medical history on file.    No past surgical history on file.    Review of patient's allergies indicates:   Allergen Reactions    Iodinated contrast media Other (See Comments)     Became bradycardic, hypotensive during angiogram contrast administration.       No current facility-administered medications on file prior to encounter.     Current Outpatient Medications on File Prior to Encounter   Medication Sig    amLODIPine (NORVASC)  10 MG tablet Take 10 mg by mouth once daily.    aspirin (ECOTRIN) 81 MG EC tablet Take 81 mg by mouth once daily.    atorvastatin (LIPITOR) 80 MG tablet Take 80 mg by mouth once daily.    carvediloL (COREG) 25 MG tablet Take 25 mg by mouth 2 (two) times daily with meals.    clopidogreL (PLAVIX) 75 mg tablet Take 75 mg by mouth once daily.    ergocalciferol (VITAMIN D2) 50,000 unit Cap Take 50,000 Units by mouth twice a week.    esomeprazole (NEXIUM) 20 MG capsule Take 20 mg by mouth before breakfast.    ezetimibe (ZETIA) 10 mg tablet Take 10 mg by mouth once daily.    levothyroxine (TIROSINT) 137 mcg Cap Take 1 tablet by mouth once daily at 6am.    linaCLOtide (LINZESS) 145 mcg Cap capsule Take 145 mcg by mouth before breakfast.    lisinopriL-hydrochlorothiazide (PRINZIDE,ZESTORETIC) 20-12.5 mg per tablet Take 1 tablet by mouth 2 (two) times a day.    metFORMIN (GLUCOPHAGE) 500 MG tablet Take 500 mg by mouth daily with breakfast.    traZODone (DESYREL) 50 MG tablet Take 50 mg by mouth every evening.    nitroGLYCERIN (NITROSTAT) 0.4 MG SL tablet Place 0.4 mg under the tongue every 5 (five) minutes as needed for Chest pain.     Family History    None       Tobacco Use    Smoking status: Not on file    Smokeless tobacco: Not on file   Substance and Sexual Activity    Alcohol use: Not on file    Drug use: Not on file    Sexual activity: Not on file     Review of Systems   Cardiovascular:  Positive for chest pain.   Respiratory:  Positive for shortness of breath.    All other systems reviewed and are negative.  Objective:     Vital Signs (Most Recent):  Temp: 98.8 °F (37.1 °C) (05/03/22 1015)  Pulse: (!) 52 (05/03/22 1620)  Resp: 20 (05/03/22 1620)  BP: 130/72 (05/03/22 1620)  SpO2: 96 % (05/03/22 1620)   Vital Signs (24h Range):  Temp:  [98.8 °F (37.1 °C)] 98.8 °F (37.1 °C)  Pulse:  [52-89] 52  Resp:  [15-22] 20  SpO2:  [93 %-100 %] 96 %  BP: (126-145)/(51-75) 130/72     Weight: 63.5 kg (139 lb 15.9  oz)  Body mass index is 23.3 kg/m².    SpO2: 96 %  O2 Device (Oxygen Therapy): nasal cannula      Intake/Output Summary (Last 24 hours) at 5/3/2022 1801  Last data filed at 5/3/2022 1616  Gross per 24 hour   Intake --   Output 1000 ml   Net -1000 ml       Lines/Drains/Airways       Arterial Line  Duration             Arterial Line 05/03/22 1115 Right Radial <1 day              Peripheral Intravenous Line  Duration                  Peripheral IV - Single Lumen 05/03/22 1115 22 G Left Hand <1 day         Peripheral IV - Single Lumen 05/03/22 1130 20 G Left Antecubital <1 day                    Physical Exam  Cardiovascular:      Rate and Rhythm: Regular rhythm.      Pulses: Normal pulses.      Heart sounds: Normal heart sounds.       Significant Labs: All pertinent lab results from the last 24 hours have been reviewed. and   Recent Lab Results         05/03/22  1603   05/03/22  1130        Abs Lymph   0.91       Abs Neutro   9.7       Albumin   2.5       Albumin/Globulin Ratio   1.3       Alkaline Phosphatase   49       ALT   13       AST   16       Baso #   0.01       Basophil %   0.1       Bilirubin, Direct   0.1       Bilirubin, Indirect   0.20       BILIRUBIN TOTAL   0.3       BNP   84.7       BUN   9.0       Calcium   6.2  Comment: Critical Result called and verified by verbal readback to: Hamida Guardado on 05/03/2022 at 12:56. Reported by [id number].       Chloride   118       CO2   20       Creatinine   0.67       Creatinine, Urine 38.6         eGFR if    >60       eGFR if non    >60       Eos #   0.00       Eosinophil %   0.0       Globulin, Total   1.9       Glucose   118       Hematocrit   35.5       Hemoglobin   11.1       Immature Grans (Abs)   0.05       Immature Granulocytes   0.5       Lymph Auto   8.3       MCH   31.6       MCHC   31.3       MCV   101.1       Mono #   0.35       Mono %   3.2       MPV   10.3       Neutrophils Relative   87.9       nRBC   0.0       Platelets    169       Potassium   2.8       Prot/Creat Ratio, Urine 321.2         PROTEIN TOTAL   4.4       Protein, Urine Random 12.4         RBC   3.51       RDW   13.4       Sodium   146       Troponin I   0.089       WBC   11.0               Significant Imaging: EKG: NSR, NL ST segments currently and no ischemic EKG changes. and X-Ray: CXR: X-Ray Chest 1 View (CXR):   Results for orders placed or performed during the hospital encounter of 05/03/22   X-Ray Chest 1 View    Narrative    EXAMINATION:  XR CHEST 1 VIEW    CLINICAL HISTORY:  Shortness of breath    COMPARISON:  None available.    FINDINGS:  Cardiopericardial silhouette is within normal limits. Lungs are without dense focal or segmental consolidation, congestive process, pleural effusions or pneumothorax.      Impression    NO ACUTE CARDIOPULMONARY PROCESS IDENTIFIED.      Electronically signed by: Sam Day  Date:    05/03/2022  Time:    13:16   5/3/2022 - [facility](At Outpt Cath Lab) - Limited study - patent RCA stents(dominant), mild diffuse LCA CAD - No LVG performed.     Assessment and Plan:     * Contrast media allergy       CAD in native artery  CAD seems stable based on the results of the abbreviated coronary angio earlier today.     Contrast media adverse reaction  Will cont ATC IV Solumedrol for now and monitor response.       VTE Risk Mitigation (From admission, onward)         Ordered     enoxaparin injection 40 mg  Every 24 hours (non-standard times)         05/03/22 3422                Thank you for your consult. I will follow-up with patient. Please contact us if you have any additional questions.    Gregor Rosales,   Cardiology   [facility] - 9th Floor Med Surg

## 2022-05-03 NOTE — H&P
[facility] Hospital Medicine History & Physical Examination       Patient Name: Dian Malik  MRN: [id number]  Patient Class: IP- Inpatient   Admission Date: 5/3/2022 11:03 AM  Admitting Physician: Dr. Eddy Raza  Length of Stay: 0  Attending Physician: Dr. Eddy Raza  Primary Care Provider: Rachna Domingo MD  Face-to-Face encounter date: 05/03/2022  Code Status: Full   Chief Complaint: Allergic Reaction (Pt was having allergic reaction to contrast.  Pt given Valium 5mg, Versed 1mg, Fentanyl 50mcg, Pepcid 20mg, Benadryl 100mg, Solumedrol 250mg, Epi x 1, Atropine x 1, Nitro 900mcg, Zofran 8mg - vital signs were stable when EMS arrived. ) and Hypotension (Pt was having cardiac cath - was given dye and went bradycardic, hypotensive. )        Patient information was obtained from patient, patient's family, past medical records and ER records. Son present at bedside.    HISTORY OF PRESENT ILLNESS:   Dian Malik is a 71 y.o. female with a PMHx of HTN, CAD stent, DM2. The patient presented to [facility] via EMS on 5/3/2022 with following a possible allergic reaction to contrast dye during an outpatient LHC. LHC was being done due to intermittent CP. Patient has a known allergy to seafood, she was premedicated with oral Prednisone 50 mg PO q 6 hrs the day prior to procedure. However following administration of IV contrast she became SOB, hypotensive and bradycardic. She was given Valium 5 mg, Versed 1 mg, Fentanyl 50 mcg, Pepcid 20 mg, Benadryl 100 mg, Solumedrol 250 mg, epi x 1, atropine x 1, NTG, and Zofran. Vitals stable upon EMS arrival. She denied having CP, SOB, abd pain, angioedema type sypmtoms at this time but did endorse one episode of N/V while in the cath room. She reports that she may have passed out, and does not recall the event. She also reports neck and back spasms x years.     Initial ED VS include /51, HR 89, RR 18, SpO2 100% on NRB. Labs were notable for sodium 146,  "potassium 2.8, chloride 118, CO2 20, calcium 6.2, hgb 11.1, hct 35.5, troponin 0.089. EKG revealed NSR. Potassium and calcium were repleted in the ED. Cardiology services were consulted and she was admitted to hospital medicine services for further work-up and management of care.   PAST MEDICAL HISTORY:   HTN, CAD s/p PCI, DM 2, HLD, Hypothyroidism, Osteoporosis    PAST SURGICAL HISTORY:   Coronary stents x2    ALLERGIES:   Seafood    FAMILY HISTORY:   Reviewed and negative    SOCIAL HISTORY:    Smokes 1/2 PPD cigarettes x25 years; Drinks 1/2 pint of liquor per week. Denied substance abuse.    HOME MEDICATIONS:     Prior to Admission medications    Not on File       REVIEW OF SYSTEMS:   Except as documented, all other systems reviewed and negative     PHYSICAL EXAM:   BP (!) 145/75   Pulse (!) 53   Temp 98.8 °F (37.1 °C)   Resp 18   Ht 5' 5" (1.651 m)   Wt 63.5 kg (139 lb 15.9 oz)   SpO2 96%   BMI 23.30 kg/m²     Vitals Reviewed  General appearance: Well-developed, well-nourished female in no apparent distress.  HENT: Atraumatic head. Moist mucous membranes of oral cavity.  Eyes: Normal extraocular movements.   Neck: Supple.   Lungs: Clear to auscultation bilaterally. No wheezing present.   Heart: Regular rate and rhythm. S1 and S2 present with no murmurs/gallop/rub. No pedal edema. No JVD present.   Abdomen: Soft, non-distended, non-tender. No rebound tenderness/guarding. Bowel sounds are normal.   Extremities: No cyanosis, clubbing, or edema.  Skin: No Rash.   Neuro: Motor and sensory exams grossly intact. Good tone. Muscle strength 5/5 in all 4 extremities  Psych/mental status: Appropriate mood and affect. Responds appropriately to questions.     LABS AND IMAGING:     Admission on 05/03/2022   Component Date Value    Sodium Level 05/03/2022 146 (A)    Potassium Level 05/03/2022 2.8 (A)    Chloride 05/03/2022 118 (A)    Carbon Dioxide 05/03/2022 20 (A)    Glucose Level 05/03/2022 118 (A)    Blood Urea " Nitrogen 05/03/2022 9.0 (A)    Creatinine 05/03/2022 0.67     Calcium Level Total 05/03/2022 6.2 (A)    Protein Total 05/03/2022 4.4 (A)    Albumin Level 05/03/2022 2.5 (A)    Globulin 05/03/2022 1.9 (A)    Albumin/Globulin Ratio 05/03/2022 1.3     Bilirubin Total 05/03/2022 0.3     Bilirubin Direct 05/03/2022 0.1     Bilirubin Indirect 05/03/2022 0.20     Alkaline Phosphatase 05/03/2022 49     Alanine Aminotransferase 05/03/2022 13     Aspartate Aminotransfera* 05/03/2022 16     Estimated GFR- Am* 05/03/2022 >60     Estimated GFR-Non Julieth* 05/03/2022 >60     Natriuretic Peptide 05/03/2022 84.7     Troponin-I 05/03/2022 0.089 (A)    WBC 05/03/2022 11.0     RBC 05/03/2022 3.51 (A)    Hgb 05/03/2022 11.1 (A)    Hct 05/03/2022 35.5 (A)    MCV 05/03/2022 101.1 (A)    MCH 05/03/2022 31.6 (A)    MCHC 05/03/2022 31.3 (A)    RDW 05/03/2022 13.4     Platelet 05/03/2022 169     MPV 05/03/2022 10.3     Neutro Auto 05/03/2022 87.9     Lymph Auto 05/03/2022 8.3     Mono Auto 05/03/2022 3.2     Eos Auto 05/03/2022 0.0     Basophil Auto 05/03/2022 0.1     Abs Lymph 05/03/2022 0.91     Abs Neutro 05/03/2022 9.7 (A)    Abs Mono 05/03/2022 0.35     Abs Eos 05/03/2022 0.00     Abs Baso 05/03/2022 0.01     IG# 05/03/2022 0.05 (A)    IG% 05/03/2022 0.5 (A)    NRBC% 05/03/2022 0.0         Microbiology Results (last 7 days)     ** No results found for the last 168 hours. **           X-Ray Chest 1 View    Result Date: 5/3/2022  EXAMINATION: XR CHEST 1 VIEW CLINICAL HISTORY: Shortness of breath COMPARISON: None available. FINDINGS: Cardiopericardial silhouette is within normal limits. Lungs are without dense focal or segmental consolidation, congestive process, pleural effusions or pneumothorax.     NO ACUTE CARDIOPULMONARY PROCESS IDENTIFIED. Electronically signed by: Sam Day Date:    05/03/2022 Time:    13:16  - pulls last radiology orders     X-Ray Chest 1 View   Final Result      NO  ACUTE CARDIOPULMONARY PROCESS IDENTIFIED.         Electronically signed by: Sam Day   Date:    05/03/2022   Time:    13:16            ASSESSMENT & PLAN:   IV contrast physiological reaction severe  Acute Hypoxemic Respiratory Failure  Acute Hypokalemia  Acute Hypocalcemia  H/o CAD s/p PCI x2  Elevated Troponin, Likely NSTEMI type II  Hypothyroidism  Tobacco Use  Hx of HLD, Osteoporosis    Plan:  Telemetry Monitoring  Cardiology consulted  Replete Potassium, Magesium and Calcium  No longer needs benadryl or steroids.  Will monitor for now.    Check PTH, BMP, Mg, Phos, Albumin and Vit D level.  Replace electrolytes.   Trend troponin x3  Nicotine patch if pt desires  Resume appropriate home medications once updated  Labs in AM        VTE Prophylaxis: Lovenox 40    __________________________________________________________________________  INPATIENT LIST OF MEDICATIONS     Current Facility-Administered Medications:     albuterol-ipratropium 2.5 mg-0.5 mg/3 mL nebulizer solution 3 mL, 3 mL, Nebulization, ED 1 Time, Flaco Raza MD    calcium gluconate 1 g in sodium chloride 0.9 % 100 mL IVPB (MB+), 1 g, Intravenous, Once, Eddy Raza MD, Last Rate: 100 mL/hr at 05/03/22 1419, 1 g at 05/03/22 1419    diphenhydrAMINE injection 12.5 mg, 12.5 mg, Intravenous, Q6H, Flaco Raza MD    enoxaparin injection 60 mg, 1 mg/kg, Subcutaneous, Q12H, Flaco Raza MD, 60 mg at 05/03/22 1359    magnesium sulfate 2g in water 50mL IVPB (premix), 2 g, Intravenous, ED 1 Time, Flaco Raza MD, Last Rate: 25 mL/hr at 05/03/22 1359, 2 g at 05/03/22 1359    methylPREDNISolone sodium succinate injection 80 mg, 80 mg, Intravenous, Q6H, Flaco Raza MD    potassium chloride 20 mEq in 100 mL IVPB (FOR CENTRAL LINE ADMINISTRATION ONLY), 40 mEq, Intravenous, Once, Eddy Raza MD    Current Outpatient Medications:     amLODIPine (NORVASC) 10 MG tablet, Take 10 mg by mouth once daily., Disp: , Rfl:     aspirin (ECOTRIN) 81  MG EC tablet, Take 81 mg by mouth once daily., Disp: , Rfl:     atorvastatin (LIPITOR) 80 MG tablet, Take 80 mg by mouth once daily., Disp: , Rfl:     carvediloL (COREG) 25 MG tablet, Take 25 mg by mouth 2 (two) times daily with meals., Disp: , Rfl:     clopidogreL (PLAVIX) 75 mg tablet, Take 75 mg by mouth once daily., Disp: , Rfl:     ergocalciferol (VITAMIN D2) 50,000 unit Cap, Take 50,000 Units by mouth twice a week., Disp: , Rfl:     esomeprazole (NEXIUM) 20 MG capsule, Take 20 mg by mouth before breakfast., Disp: , Rfl:     ezetimibe (ZETIA) 10 mg tablet, Take 10 mg by mouth once daily., Disp: , Rfl:     levothyroxine (TIROSINT) 137 mcg Cap, Take 1 tablet by mouth once daily at 6am., Disp: , Rfl:     linaCLOtide (LINZESS) 145 mcg Cap capsule, Take 145 mcg by mouth before breakfast., Disp: , Rfl:     lisinopriL-hydrochlorothiazide (PRINZIDE,ZESTORETIC) 20-12.5 mg per tablet, Take 1 tablet by mouth 2 (two) times a day., Disp: , Rfl:     metFORMIN (GLUCOPHAGE) 500 MG tablet, Take 500 mg by mouth daily with breakfast., Disp: , Rfl:     traZODone (DESYREL) 50 MG tablet, Take 50 mg by mouth every evening., Disp: , Rfl:     nitroGLYCERIN (NITROSTAT) 0.4 MG SL tablet, Place 0.4 mg under the tongue every 5 (five) minutes as needed for Chest pain., Disp: , Rfl:       Scheduled Meds:   albuterol-ipratropium  3 mL Nebulization ED 1 Time    calcium gluconate IVPB  1 g Intravenous Once    diphenhydrAMINE  12.5 mg Intravenous Q6H    enoxaparin  1 mg/kg Subcutaneous Q12H    magnesium sulfate IVPB  2 g Intravenous ED 1 Time    methylPREDNISolone sodium succinate  80 mg Intravenous Q6H    potassium chloride in water  40 mEq Intravenous Once     Continuous Infusions:  PRN Meds:.      Discharge Planning and Disposition:       Diana MARTINEZ NP have reviewed and discussed the case with Dr. Eddy Raza.  Please see the following addendum for further assessment and plan from there attending MD. Diana LARSON  Alexei AGACNP-BC   05/03/2022    ________________________________________________________________________________    MD Addendum:  I, Eddy Raza assumed care of this patient today at --am/pm  For the patient encounter, I performed the substantive portion of the visit, I reviewed the NP/PA documentation, treatment plan, and medical decision making.  I had face to face time with this patient     71-year-old lady who was having outpatient elective coronary angiogram.  She has a history of seafood allergy but has never received contrast in the past.  Today she received contrast after being given premedication with steroids at home.  While on the cath table patient started having bradycardia and hypotension requiring IV atropine.  On lab work she was noted to have hypokalemia and hypocalcemia unclear if this has been an ongoing issue or acute.    On exam she is resting comfortably, lungs are clear, regular rate and rhythm, abdomen is soft nontender, legs are warm without edema, no muscle twitching    Severe IV contrast physiologic reaction  Bradycardia/hypotension/nausea vomiting-resolved  Hypokalemia, hypocalcemia  Mild troponin elevation    History of:  HTN, dm 2, CAD stents, HLD      Suspect more of a physiologic/vasovagal response to contrast than a true contrast allergy.  Discontinue IV Benadryl and Solu-Medrol as she does not have any current evidence of angioedema or urticaria.  Reaction seems to have resolved now that she is back to baseline.  Replace potassium and calcium.  Obtain PTH, vitamin-D, phosphorus levels.  It would be helpful if we can obtain lab work results from Dr. Camacho office done recently regarding electrolytes.    Critical Care diagnosis:  Severe IV contrast physiologic response with bradycardia and hypotension  Critical care time spent:  60 minutes    All diagnosis and differential diagnosis have been reviewed; assessment and plan has been documented; I have personally reviewed the labs and  test results that are presently available; I have reviewed the patients medication list; I have reviewed the consulting providers response and recommendations. I have reviewed or attempted to review medical records based upon their availability.    All of the patient and family questions have been addressed and answered. Patient's is agreeable to the above stated plan. I will continue to monitor closely and make adjustments to medical management as needed.

## 2022-05-04 VITALS
DIASTOLIC BLOOD PRESSURE: 61 MMHG | OXYGEN SATURATION: 95 % | BODY MASS INDEX: 23.9 KG/M2 | RESPIRATION RATE: 20 BRPM | SYSTOLIC BLOOD PRESSURE: 112 MMHG | HEART RATE: 82 BPM | HEIGHT: 64 IN | WEIGHT: 140 LBS | TEMPERATURE: 98 F

## 2022-05-04 LAB
ALBUMIN SERPL-MCNC: 3.8 GM/DL (ref 3.4–4.8)
ALBUMIN/GLOB SERPL: 1.2 RATIO (ref 1.1–2)
ALP SERPL-CCNC: 70 UNIT/L (ref 40–150)
ALT SERPL-CCNC: 25 UNIT/L (ref 0–55)
AST SERPL-CCNC: 25 UNIT/L (ref 5–34)
BASOPHILS # BLD AUTO: 0.03 X10(3)/MCL (ref 0–0.2)
BASOPHILS NFR BLD AUTO: 0.2 %
BILIRUBIN DIRECT+TOT PNL SERPL-MCNC: 0.1 MG/DL (ref 0–0.8)
BILIRUBIN DIRECT+TOT PNL SERPL-MCNC: 0.2 MG/DL (ref 0–0.5)
BILIRUBIN DIRECT+TOT PNL SERPL-MCNC: 0.3 MG/DL
BUN SERPL-MCNC: 15.9 MG/DL (ref 9.8–20.1)
CALCIUM SERPL-MCNC: 9.1 MG/DL (ref 8.4–10.2)
CHLORIDE SERPL-SCNC: 109 MMOL/L (ref 98–107)
CK MB SERPL-MCNC: 2.4 NG/ML
CK SERPL-CCNC: 149 U/L (ref 29–168)
CO2 SERPL-SCNC: 25 MMOL/L (ref 23–31)
CREAT SERPL-MCNC: 1.13 MG/DL (ref 0.55–1.02)
EOSINOPHIL # BLD AUTO: 0 X10(3)/MCL (ref 0–0.9)
EOSINOPHIL NFR BLD AUTO: 0 %
ERYTHROCYTE [DISTWIDTH] IN BLOOD BY AUTOMATED COUNT: 13.7 % (ref 11.5–17)
GLOBULIN SER-MCNC: 3.1 GM/DL (ref 2.4–3.5)
GLUCOSE SERPL-MCNC: 136 MG/DL (ref 70–110)
GLUCOSE SERPL-MCNC: 140 MG/DL (ref 82–115)
HCT VFR BLD AUTO: 44.6 % (ref 37–47)
HGB BLD-MCNC: 14.7 GM/DL (ref 12–16)
IMM GRANULOCYTES # BLD AUTO: 0.11 X10(3)/MCL (ref 0–0.02)
IMM GRANULOCYTES NFR BLD AUTO: 0.6 % (ref 0–0.43)
LYMPHOCYTES # BLD AUTO: 1.15 X10(3)/MCL (ref 0.6–4.6)
LYMPHOCYTES NFR BLD AUTO: 5.8 %
MAGNESIUM SERPL-MCNC: 2.2 MG/DL (ref 1.6–2.6)
MCH RBC QN AUTO: 31.3 PG (ref 27–31)
MCHC RBC AUTO-ENTMCNC: 33 MG/DL (ref 33–36)
MCV RBC AUTO: 95.1 FL (ref 80–94)
MONOCYTES # BLD AUTO: 0.76 X10(3)/MCL (ref 0.1–1.3)
MONOCYTES NFR BLD AUTO: 3.8 %
NEUTROPHILS # BLD AUTO: 17.8 X10(3)/MCL (ref 2.1–9.2)
NEUTROPHILS NFR BLD AUTO: 89.6 %
NRBC BLD AUTO-RTO: 0 %
PLATELET # BLD AUTO: 237 X10(3)/MCL (ref 130–400)
PMV BLD AUTO: 10.5 FL (ref 9.4–12.4)
POC ACTIVATED CLOTTING TIME K: 130 SEC (ref 74–137)
POCT GLUCOSE: 136 MG/DL (ref 70–110)
POCT GLUCOSE: 238 MG/DL (ref 70–110)
POTASSIUM SERPL-SCNC: 4.9 MMOL/L (ref 3.5–5.1)
PROT SERPL-MCNC: 6.9 GM/DL (ref 5.8–7.6)
RBC # BLD AUTO: 4.69 X10(6)/MCL (ref 4.2–5.4)
SAMPLE: NORMAL
SODIUM SERPL-SCNC: 143 MMOL/L (ref 136–145)
TROPONIN I SERPL-MCNC: 0.32 NG/ML (ref 0–0.04)
WBC # SPEC AUTO: 19.8 X10(3)/MCL (ref 4.5–11.5)

## 2022-05-04 PROCEDURE — 82550 ASSAY OF CK (CPK): CPT | Performed by: INTERNAL MEDICINE

## 2022-05-04 PROCEDURE — 63600175 PHARM REV CODE 636 W HCPCS: Performed by: INTERNAL MEDICINE

## 2022-05-04 PROCEDURE — 85025 COMPLETE CBC W/AUTO DIFF WBC: CPT | Performed by: INTERNAL MEDICINE

## 2022-05-04 PROCEDURE — 82553 CREATINE MB FRACTION: CPT | Performed by: INTERNAL MEDICINE

## 2022-05-04 PROCEDURE — 36415 COLL VENOUS BLD VENIPUNCTURE: CPT | Performed by: INTERNAL MEDICINE

## 2022-05-04 PROCEDURE — 80053 COMPREHEN METABOLIC PANEL: CPT | Performed by: INTERNAL MEDICINE

## 2022-05-04 PROCEDURE — 83735 ASSAY OF MAGNESIUM: CPT | Performed by: INTERNAL MEDICINE

## 2022-05-04 PROCEDURE — 84484 ASSAY OF TROPONIN QUANT: CPT | Performed by: INTERNAL MEDICINE

## 2022-05-04 PROCEDURE — 25000003 PHARM REV CODE 250: Performed by: INTERNAL MEDICINE

## 2022-05-04 RX ORDER — METHYLPREDNISOLONE 4 MG/1
TABLET ORAL
Qty: 21 EACH | Refills: 0 | Status: SHIPPED | OUTPATIENT
Start: 2022-05-04 | End: 2022-05-25

## 2022-05-04 RX ADMIN — ATORVASTATIN CALCIUM 80 MG: 40 TABLET, FILM COATED ORAL at 09:05

## 2022-05-04 RX ADMIN — ENOXAPARIN SODIUM 60 MG: 100 INJECTION SUBCUTANEOUS at 05:05

## 2022-05-04 RX ADMIN — ENOXAPARIN SODIUM 60 MG: 100 INJECTION SUBCUTANEOUS at 06:05

## 2022-05-04 RX ADMIN — EZETIMIBE 10 MG: 10 TABLET ORAL at 09:05

## 2022-05-04 RX ADMIN — METHYLPREDNISOLONE SODIUM SUCCINATE 80 MG: 40 INJECTION, POWDER, FOR SOLUTION INTRAMUSCULAR; INTRAVENOUS at 06:05

## 2022-05-04 RX ADMIN — LINACLOTIDE 145 MCG: 145 CAPSULE, GELATIN COATED ORAL at 05:05

## 2022-05-04 RX ADMIN — LEVOTHYROXINE SODIUM 137 MCG: 0.11 TABLET ORAL at 05:05

## 2022-05-04 RX ADMIN — CLOPIDOGREL 75 MG: 75 TABLET, FILM COATED ORAL at 09:05

## 2022-05-04 RX ADMIN — METHYLPREDNISOLONE SODIUM SUCCINATE 80 MG: 40 INJECTION, POWDER, FOR SOLUTION INTRAMUSCULAR; INTRAVENOUS at 02:05

## 2022-05-04 RX ADMIN — PANTOPRAZOLE SODIUM 40 MG: 40 TABLET, DELAYED RELEASE ORAL at 09:05

## 2022-05-04 RX ADMIN — ASPIRIN 81 MG: 81 TABLET, COATED ORAL at 09:05

## 2022-05-04 NOTE — DISCHARGE SUMMARY
[facility]  HOSPITAL MEDICINE   DISCHARGE SUMMARY    Patient Name: Dian Malik  MRN: [id number]  Admission Date: 5/3/2022  Hospital Length of Stay: 1 days  Discharge Date and Time: No discharge date for patient encounter.  Discharge Provider: Eddy Raza  Primary Care Provider: Rachna Domingo MD      DISCHARGE DIAGNOSIS  Severe IV contrast physiologic reaction  Bradycardia/hypotension/nausea vomiting-resolved  Hypokalemia, hypocalcemia-resolved  Non STEMI type 1 versus coronary spasm     History of:  HTN, dm 2, CAD stents, HLD        HOSPITAL COURSE  Dian Malik is a 71 y.o. female with a PMHx of HTN, CAD stent, DM2. The patient presented to [facility] via EMS on 5/3/2022 with following a possible allergic reaction to contrast dye during an outpatient LHC. LHC was being done due to intermittent CP. Patient has a known allergy to seafood, she was premedicated with oral Prednisone 50 mg PO q 6 hrs the day prior to procedure. However following administration of IV contrast she became SOB, hypotensive and bradycardic. She was given Valium 5 mg, Versed 1 mg, Fentanyl 50 mcg, Pepcid 20 mg, Benadryl 100 mg, Solumedrol 250 mg, epi x 1, atropine x 1, NTG, and Zofran. Vitals stable upon EMS arrival. She denied having CP, SOB, abd pain, angioedema type sypmtoms at this time but did endorse one episode of N/V while in the cath room. She reports that she may have passed out, and does not recall the event. She also reports neck and back spasms x years.   Initial ED VS include /51, HR 89, RR 18, SpO2 100% on NRB. Labs were notable for sodium 146, potassium 2.8, chloride 118, CO2 20, calcium 6.2, hgb 11.1, hct 35.5, troponin 0.089. EKG revealed NSR. Potassium and calcium were repleted in the ED. Cardiology services were consulted and she was admitted to hospital medicine services for further work-up and management of care.      Per Dr. Camacho note patient was at the outpatient cath lab where  "it appears she had developed a severe contrast dye allergy where coronary flow drastically slow down after the 1st couple injections during angiogram, during angiogram developed severe chest pain, shortness a breath, ischemic EKG changes along with hypotension and bradycardia.    At this time patient has been cleared by Cardiology for discharge.  Cardiology recommends Medrol Dosepak which I will provide for her on discharge.  Otherwise continue all other home medications.        PHYSICAL EXAM  /67   Pulse 69   Temp 98.4 °F (36.9 °C) (Oral)   Resp 17   Ht 5' 4" (1.626 m)   Wt (P) 63.5 kg (139 lb 15.9 oz)   SpO2 95%   Breastfeeding No   BMI (P) 24.03 kg/m²    General: In no acute distress, afebrile  Chest: Clear to auscultation bilaterally  Heart: S1, S2, no appreciable murmur  Abdomen: Soft, nontender, BS +  MSK: Warm, no lower extremity edema, no clubbing or cyanosis  Neurologic: Alert and oriented x4, moving all extremities with good strength  _____________________________________________________________________________      DISCHARGE MEDICATION RECONCILIATION  Current Discharge Medication List      START taking these medications    Details   methylPREDNISolone (MEDROL DOSEPACK) 4 mg tablet use as directed  Qty: 21 each, Refills: 0    Comments: Please provide one medrol dose packet.         CONTINUE these medications which have NOT CHANGED    Details   amLODIPine (NORVASC) 10 MG tablet Take 10 mg by mouth once daily.      aspirin (ECOTRIN) 81 MG EC tablet Take 81 mg by mouth once daily.      atorvastatin (LIPITOR) 80 MG tablet Take 80 mg by mouth once daily.      carvediloL (COREG) 25 MG tablet Take 25 mg by mouth 2 (two) times daily with meals.      clopidogreL (PLAVIX) 75 mg tablet Take 75 mg by mouth once daily.      ergocalciferol (VITAMIN D2) 50,000 unit Cap Take 50,000 Units by mouth twice a week.      esomeprazole (NEXIUM) 20 MG capsule Take 20 mg by mouth before breakfast.      ezetimibe " (ZETIA) 10 mg tablet Take 10 mg by mouth once daily.      levothyroxine (TIROSINT) 137 mcg Cap Take 1 tablet by mouth once daily at 6am.      linaCLOtide (LINZESS) 145 mcg Cap capsule Take 145 mcg by mouth before breakfast.      lisinopriL-hydrochlorothiazide (PRINZIDE,ZESTORETIC) 20-12.5 mg per tablet Take 1 tablet by mouth 2 (two) times a day.      metFORMIN (GLUCOPHAGE) 500 MG tablet Take 500 mg by mouth daily with breakfast.      traZODone (DESYREL) 50 MG tablet Take 50 mg by mouth every evening.      nitroGLYCERIN (NITROSTAT) 0.4 MG SL tablet Place 0.4 mg under the tongue every 5 (five) minutes as needed for Chest pain.                CONSULTS  Consults (From admission, onward)        Status Ordering Provider     Inpatient consult to Cardiology  Once        Provider:  DO Shreya Izquierdo ROSHAN P            FOLLOW UP   Follow-up Information     Gregor Rosales DO Follow up in 1 week(s).    Specialty: Cardiology  Contact information:  [address]  [facility] [address]  [phone]             Rachna Domingo MD Follow up in 3 day(s).    Specialty: Family Medicine  Why: will need CMP to follow up potassium and calcium level  Contact information:  [address]  [facility] [address] [phone]                             DISCHARGE INSTRUCTIONS  Explained in detail to the patient about the discharge plan, medications, and follow-up visits. Pt understands and agrees with the treatment plan.  Discharged Condition: stable  Diet as tolerated  Activities as tolerated  Discharge to:  Home    TIME SPENT ON DISCHARGE  35 minutes        Eddy Sanchez M.D on 5/4/2022. at 3:22 PM.  Internal Medicine  [facility] Medicine    This document was created using electronic dictation services.  Please excuse any errors that may have been made.  Contact me if any questions regarding documentation to clarify verbiage.

## 2022-05-04 NOTE — NURSING
Pt arrived to floor AAOx4, no c/o pain or SOB. Arterial line present and intact in R wrist/forearm. No s/s of distress or discomfort.

## 2022-05-04 NOTE — ASSESSMENT & PLAN NOTE
CAD seems stable based on the results of the abbreviated coronary angio earlier yesterday - also clinically stable.

## 2022-05-04 NOTE — PROGRESS NOTES
[facility] MEDICINE PROGRESS NOTE      CHIEF COMPLAINT  Hospital follow up    HOSPITAL COURSE  Dian Malik is a 71 y.o. female with a PMHx of HTN, CAD stent, DM2. The patient presented to [facility] via EMS on 5/3/2022 with following a possible allergic reaction to contrast dye during an outpatient LHC. LHC was being done due to intermittent CP. Patient has a known allergy to seafood, she was premedicated with oral Prednisone 50 mg PO q 6 hrs the day prior to procedure. However following administration of IV contrast she became SOB, hypotensive and bradycardic. She was given Valium 5 mg, Versed 1 mg, Fentanyl 50 mcg, Pepcid 20 mg, Benadryl 100 mg, Solumedrol 250 mg, epi x 1, atropine x 1, NTG, and Zofran. Vitals stable upon EMS arrival. She denied having CP, SOB, abd pain, angioedema type sypmtoms at this time but did endorse one episode of N/V while in the cath room. She reports that she may have passed out, and does not recall the event. She also reports neck and back spasms x years.   Initial ED VS include /51, HR 89, RR 18, SpO2 100% on NRB. Labs were notable for sodium 146, potassium 2.8, chloride 118, CO2 20, calcium 6.2, hgb 11.1, hct 35.5, troponin 0.089. EKG revealed NSR. Potassium and calcium were repleted in the ED. Cardiology services were consulted and she was admitted to hospital medicine services for further work-up and management of care.     Per Dr. Camacho note patient was at the outpatient cath lab where it appears she had developed a severe contrast dye allergy where coronary flow drastically slow down after the 1st couple injections during angiogram, during angiogram developed severe chest pain, shortness a breath, ischemic EKG changes along with hypotension and bradycardia.    Today  Feeling back to normal.  She did have some episode of chest pain last night but no more this morning.  She is feeling well this morning.  Her troponins have trended  "down.        OBJECTIVE/PHYSICAL EXAM  /67   Pulse 69   Temp 98.4 °F (36.9 °C) (Oral)   Resp 17   Ht 5' 4" (1.626 m)   Wt (P) 63.5 kg (139 lb 15.9 oz)   SpO2 95%   Breastfeeding No   BMI (P) 24.03 kg/m²   General: In no acute distress, afebrile  Chest: Clear to auscultation bilaterally  Heart: S1, S2, no appreciable murmur  Abdomen: Soft, nontender, BS +  MSK: Warm, no lower extremity edema, no clubbing or cyanosis  Neurologic: Alert and oriented x4, moving all extremities with good strength         ASSESSMENT/PLAN  Severe IV contrast physiologic reaction  Bradycardia/hypotension/nausea vomiting-resolved  Hypokalemia, hypocalcemia-resolved  Non STEMI type 1 versus coronary spasm     History of:  HTN, dm 2, CAD stents, HLD        Suspect more of a physiologic/vasovagal response to contrast than a true contrast allergy.  Cardiology following.  Continue with aspirin 81, atorvastatin, Plavix, full-dose Lovenox.  Also Cardiology has ordered for Solu-Medrol to be continued.  It would be helpful if we can obtain lab work results from Dr. Camacho office done recently regarding electrolytes.  At this time from our standpoint okay to discontinue steroids once not needed from Cardiology standpoint.  Electrolytes stable and resolved.  May have just been an acute issue, recommend repeat lab work as outpatient within a week.    Anticipated discharge and disposition:  Discharge home once cleared by Cardiology.  __________________________________________________________________________    LABS/MICROBIOLOGY/MEDICATIONS/DIAGNOSTICS    Recent Results (from the past 24 hour(s))   Protein/Creatinine Ratio, Urine    Collection Time: 05/03/22  4:03 PM   Result Value Ref Range    Urine Protein Level 12.4 mg/dL    Urine Creatinine 38.6 (L) 47.0 - 110.0 mg/dL    Urine Protein/Creatinine Ratio 321.2 (H) <=200.0 mg/gm Cr   PTH, Intact    Collection Time: 05/03/22  6:00 PM   Result Value Ref Range    Parathyroid Hormone Intact 62.7 " 8.7 - 77.0 pg/mL   CK-MB    Collection Time: 05/03/22  6:00 PM   Result Value Ref Range    Creatine Kinase MB 4.5 (H) <=3.4 ng/mL   CPK    Collection Time: 05/03/22  6:00 PM   Result Value Ref Range    Creatine Kinase 148 29 - 168 U/L   Troponin I    Collection Time: 05/03/22  6:00 PM   Result Value Ref Range    Troponin-I 0.602 (H) 0.000 - 0.045 ng/mL   Basic Metabolic Panel    Collection Time: 05/03/22  8:45 PM   Result Value Ref Range    Sodium Level 141 136 - 145 mmol/L    Potassium Level 4.3 3.5 - 5.1 mmol/L    Chloride 104 98 - 107 mmol/L    Carbon Dioxide 23 23 - 31 mmol/L    Glucose Level 226 (H) 82 - 115 mg/dL    Blood Urea Nitrogen 13.1 9.8 - 20.1 mg/dL    Creatinine 1.14 (H) 0.55 - 1.02 mg/dL    BUN/Creatinine Ratio 11     Calcium Level Total 9.4 8.4 - 10.2 mg/dL    Estimated GFR- 60 mls/min/1.73/m2    Estimated GFR-Non  50 mls/min/1.73/m2    Anion Gap 14.0 mEq/L   Magnesium    Collection Time: 05/03/22  8:45 PM   Result Value Ref Range    Magnesium Level 2.30 1.60 - 2.60 mg/dL   Phosphorus    Collection Time: 05/03/22  8:45 PM   Result Value Ref Range    Phosphorus Level 3.0 2.3 - 4.7 mg/dL   Vitamin D    Collection Time: 05/03/22  8:45 PM   Result Value Ref Range    Vit D 25 OH 43.7 30.0 - 80.0 ng/mL   Albumin    Collection Time: 05/03/22  8:45 PM   Result Value Ref Range    Albumin Level 3.8 3.4 - 4.8 gm/dL   CK-MB    Collection Time: 05/03/22  8:45 PM   Result Value Ref Range    Creatine Kinase MB 3.7 (H) <=3.4 ng/mL   CPK    Collection Time: 05/03/22  8:45 PM   Result Value Ref Range    Creatine Kinase 146 29 - 168 U/L   Troponin I    Collection Time: 05/03/22  8:45 PM   Result Value Ref Range    Troponin-I 0.649 (H) 0.000 - 0.045 ng/mL   POCT Glucose, Hand-Held Device    Collection Time: 05/03/22 10:00 PM   Result Value Ref Range    POC Glucose 288 (A) 70 - 110 MG/DL   POCT glucose    Collection Time: 05/03/22 10:16 PM   Result Value Ref Range    POCT Glucose 288 (H)  70 - 110 mg/dL   ISTAT ACT-K    Collection Time: 05/03/22 11:15 PM   Result Value Ref Range    POC ACTIVATED CLOTTING TIME K 130 74 - 137 sec    Sample unknown    POCT glucose    Collection Time: 05/03/22 11:33 PM   Result Value Ref Range    POCT Glucose 238 (H) 70 - 110 mg/dL   CK-MB    Collection Time: 05/04/22  4:25 AM   Result Value Ref Range    Creatine Kinase MB 2.4 <=3.4 ng/mL   CPK    Collection Time: 05/04/22  4:25 AM   Result Value Ref Range    Creatine Kinase 149 29 - 168 U/L   Comprehensive Metabolic Panel    Collection Time: 05/04/22  4:25 AM   Result Value Ref Range    Sodium Level 143 136 - 145 mmol/L    Potassium Level 4.9 3.5 - 5.1 mmol/L    Chloride 109 (H) 98 - 107 mmol/L    Carbon Dioxide 25 23 - 31 mmol/L    Glucose Level 140 (H) 82 - 115 mg/dL    Blood Urea Nitrogen 15.9 9.8 - 20.1 mg/dL    Creatinine 1.13 (H) 0.55 - 1.02 mg/dL    Calcium Level Total 9.1 8.4 - 10.2 mg/dL    Protein Total 6.9 5.8 - 7.6 gm/dL    Albumin Level 3.8 3.4 - 4.8 gm/dL    Globulin 3.1 2.4 - 3.5 gm/dL    Albumin/Globulin Ratio 1.2 1.1 - 2.0 ratio    Bilirubin Total 0.3 <=1.5 mg/dL    Bilirubin Direct 0.2 0.0 - 0.5 mg/dL    Bilirubin Indirect 0.10 0.00 - 0.80 mg/dL    Alkaline Phosphatase 70 40 - 150 unit/L    Alanine Aminotransferase 25 0 - 55 unit/L    Aspartate Aminotransferase 25 5 - 34 unit/L    Estimated GFR- >60 mls/min/1.73/m2    Estimated GFR-Non  50 mls/min/1.73/m2   Magnesium    Collection Time: 05/04/22  4:25 AM   Result Value Ref Range    Magnesium Level 2.20 1.60 - 2.60 mg/dL   Troponin I    Collection Time: 05/04/22  4:25 AM   Result Value Ref Range    Troponin-I 0.315 (H) 0.000 - 0.045 ng/mL   CBC with Differential    Collection Time: 05/04/22  4:25 AM   Result Value Ref Range    WBC 19.8 (H) 4.5 - 11.5 x10(3)/mcL    RBC 4.69 4.20 - 5.40 x10(6)/mcL    Hgb 14.7 12.0 - 16.0 gm/dL    Hct 44.6 37.0 - 47.0 %    MCV 95.1 (H) 80.0 - 94.0 fL    MCH 31.3 (H) 27.0 - 31.0 pg    MCHC  33.0 33.0 - 36.0 mg/dL    RDW 13.7 11.5 - 17.0 %    Platelet 237 130 - 400 x10(3)/mcL    MPV 10.5 9.4 - 12.4 fL    Neutro Auto 89.6 %    Lymph Auto 5.8 %    Mono Auto 3.8 %    Eos Auto 0.0 %    Basophil Auto 0.2 %    Abs Lymph 1.15 0.6 - 4.6 x10(3)/mcL    Abs Neutro 17.8 (H) 2.1 - 9.2 x10(3)/mcL    Abs Mono 0.76 0.1 - 1.3 x10(3)/mcL    Abs Eos 0.00 0 - 0.9 x10(3)/mcL    Abs Baso 0.03 0 - 0.2 x10(3)/mcL    IG# 0.11 (H) 0 - 0.0155 x10(3)/mcL    IG% 0.6 (H) 0 - 0.43 %    NRBC% 0.0 %       Microbiology Results (last 7 days)     ** No results found for the last 168 hours. **          Current Facility-Administered Medications   Medication Frequency    amLODIPine tablet 10 mg Daily    aspirin EC tablet 81 mg Daily    atorvastatin tablet 80 mg Daily    clopidogreL tablet 75 mg Daily    dextrose 10% bolus 125 mL PRN    enoxaparin injection 60 mg Q12H    ezetimibe tablet 10 mg Daily    glucagon (human recombinant) injection 1 mg PRN    glucose chewable tablet 16 g PRN    glucose chewable tablet 24 g PRN    hydroCHLOROthiazide (HYDRODIURIL) 12.5 mg, lisinopriL 20 mg BID    insulin aspart U-100 injection 0-5 Units QID (AC + HS) PRN    levothyroxine tablet 137 mcg Before breakfast    linaCLOtide capsule 145 mcg Before breakfast    methylPREDNISolone sodium succinate injection 80 mg Q8H    pantoprazole EC tablet 40 mg Daily    traZODone tablet 50 mg QHS        X-Ray Chest 1 View   Final Result      NO ACUTE CARDIOPULMONARY PROCESS IDENTIFIED.         Electronically signed by: Sam Day   Date:    05/03/2022   Time:    13:16               All diagnosis and differential diagnosis have been reviewed; assessment and plan has been documented. I have personally reviewed the labs and test results that are presently available; I have reviewed the patients medication list. I have reviewed the consulting providers response and recommendations. I have reviewed or attempted to review medical records based upon their  availability.  This document was created using electronic dictation services.  Please excuse any errors that may have been made.  Contact me if any questions regarding documentation to clarify verbiage.    All of the patient's questions have been addressed and answered. Patient's is agreeable to the above stated plan. I will continue to monitor closely and make adjustments to medical management as needed.      Eddy Raza MD   05/04/2022   Internal Medicine

## 2022-05-04 NOTE — PROGRESS NOTES
[facility] Tele[facility]  Cardiology  Progress Note    Patient Name: Dian Malik  MRN: [id number]  Admission Date: 5/3/2022  Hospital Length of Stay: 1 days  Code Status: No Order   Attending Physician: Eddy Raza MD   Primary Care Physician: Rachna Domingo MD  Expected Discharge Date:   Principal Problem:Contrast media allergy    Subjective:     Hospital Course:   No clinical changes other than 'feeling back to normal and wanting to go home to care for her 5 grandkids'.  Denies cp/SOB.         Interval History: Pt admitted for close monitoring after suffering with a severe contrast dye allergy during [facility] that resulted in very slow coronary blood flow that responded to med mgmt.  Feeling back to normal.      ROS  Objective:     Vital Signs (Most Recent):  Temp: 98.4 °F (36.9 °C) (05/04/22 1137)  Pulse: 69 (05/04/22 1137)  Resp: 17 (05/04/22 1137)  BP: 117/67 (05/04/22 1137)  SpO2: 95 % (05/04/22 1137) Vital Signs (24h Range):  Temp:  [97.7 °F (36.5 °C)-98.8 °F (37.1 °C)] 98.4 °F (36.9 °C)  Pulse:  [50-69] 69  Resp:  [17-20] 17  SpO2:  [95 %-100 %] 95 %  BP: ()/(51-80) 117/67     Weight: (P) 63.5 kg (139 lb 15.9 oz)  Body mass index is 24.03 kg/m² (pended).     SpO2: 95 %  O2 Device (Oxygen Therapy): nasal cannula      Intake/Output Summary (Last 24 hours) at 5/4/2022 1418  Last data filed at 5/4/2022 1328  Gross per 24 hour   Intake 720 ml   Output 1250 ml   Net -530 ml       Lines/Drains/Airways       Drain  Duration             Female External Urinary Catheter 05/03/22 0000 1 day              Peripheral Intravenous Line  Duration                  Peripheral IV - Single Lumen 05/03/22 1115 22 G Left Hand 1 day         Peripheral IV - Single Lumen 05/03/22 1130 20 G Left Antecubital 1 day                    Physical Exam    Significant Labs: BMP:   Recent Labs   Lab 05/03/22  1130 05/03/22 2045 05/04/22  0425   CO2 20* 23 25   BUN 9.0* 13.1 15.9   CREATININE 0.67 1.14*  1.13*   CALCIUM 6.2* 9.4 9.1   , CMP   Recent Labs   Lab 05/03/22  1130 05/03/22 2045 05/04/22  0425   CO2 20* 23 25   BUN 9.0* 13.1 15.9   CREATININE 0.67 1.14* 1.13*   CALCIUM 6.2* 9.4 9.1   ALBUMIN 2.5* 3.8 3.8   AST 16  --  25   ALT 13  --  25   , CBC   Recent Labs   Lab 05/03/22  1130 05/04/22  0425   WBC 11.0 19.8*   HGB 11.1* 14.7   HCT 35.5* 44.6   , Lipid Panel No results for input(s): CHOL, HDL, LDLCALC, TRIG, CHOLHDL in the last 48 hours., Troponin   Recent Labs   Lab 05/03/22  1800 05/03/22 2045 05/04/22  0425   TROPONINI 0.602* 0.649* 0.315*   , and   Recent Lab Results  (Last 5 results in the past 24 hours)        05/04/22  0425   05/03/22  2333   05/03/22  2315   05/03/22  2216   05/03/22  2200        Abs Lymph 1.15               Abs Neutro 17.8               Albumin 3.8               Albumin/Globulin Ratio 1.2               Alkaline Phosphatase 70               ALT 25               AST 25               Baso # 0.03               Basophil % 0.2               Bilirubin, Direct 0.2               Bilirubin, Indirect 0.10               BILIRUBIN TOTAL 0.3               BUN 15.9               Calcium 9.1               Chloride 109               CO2 25                              CPK MB 2.4               Creatinine 1.13               eGFR if  >60               eGFR if non  50               Eos # 0.00               Eosinophil % 0.0               Globulin, Total 3.1               Glucose 140               Hematocrit 44.6               Hemoglobin 14.7               Immature Grans (Abs) 0.11               Immature Granulocytes 0.6               Lymph Auto 5.8               Magnesium 2.20               MCH 31.3               MCHC 33.0               MCV 95.1               Mono # 0.76               Mono % 3.8               MPV 10.5               Neutrophils Relative 89.6               nRBC 0.0               Platelets 237               POC ACTIVATED CLOTTING TIME K     130            POC Glucose         288       POCT Glucose   238     288         Potassium 4.9               PROTEIN TOTAL 6.9               RBC 4.69               RDW 13.7               Sample     unknown           Sodium 143               Troponin I 0.315               WBC 19.8                                      Significant Imaging: EKG: NSR, No ischemic changes and X-Ray: CXR: X-Ray Chest 1 View (CXR):   Results for orders placed or performed during the hospital encounter of 05/03/22   X-Ray Chest 1 View    Narrative    EXAMINATION:  XR CHEST 1 VIEW    CLINICAL HISTORY:  Shortness of breath    COMPARISON:  None available.    FINDINGS:  Cardiopericardial silhouette is within normal limits. Lungs are without dense focal or segmental consolidation, congestive process, pleural effusions or pneumothorax.      Impression    NO ACUTE CARDIOPULMONARY PROCESS IDENTIFIED.      Electronically signed by: Sam Day  Date:    05/03/2022  Time:    13:16     Assessment and Plan:     Brief HPI: Noted.     * Contrast media allergy  As above.     CAD in native artery  CAD seems stable based on the results of the abbreviated coronary angio earlier yesterday - also clinically stable.     Contrast media adverse reaction  Suggest dc home with 5-day Medrol Dosepak in light of the possibility of a delay reaction        VTE Risk Mitigation (From admission, onward)         Ordered     enoxaparin injection 60 mg  Every 12 hours (non-standard times)         05/03/22 2052                Gregor Rosales DO  Cardiology  [facility] Tele[facility]

## 2022-05-04 NOTE — PLAN OF CARE
Problem: Adult Inpatient Plan of Care  Goal: Plan of Care Review  Outcome: Ongoing, Progressing  Goal: Patient-Specific Goal (Individualized)  Outcome: Ongoing, Progressing  Goal: Absence of Hospital-Acquired Illness or Injury  Outcome: Ongoing, Progressing  Goal: Optimal Comfort and Wellbeing  Outcome: Ongoing, Progressing  Goal: Readiness for Transition of Care  Outcome: Ongoing, Progressing     Problem: Hyperglycemia  Goal: Blood Glucose Level Within Targeted Range  Outcome: Ongoing, Progressing     Problem: Fall Injury Risk  Goal: Absence of Fall and Fall-Related Injury  Outcome: Ongoing, Progressing

## 2022-05-04 NOTE — SUBJECTIVE & OBJECTIVE
Interval History: Pt admitted for close monitoring after suffering with a severe contrast dye allergy during LHC that resulted in very slow coronary blood flow that responded to med mgmt.  Feeling back to normal.      ROS  Objective:     Vital Signs (Most Recent):  Temp: 98.4 °F (36.9 °C) (05/04/22 1137)  Pulse: 69 (05/04/22 1137)  Resp: 17 (05/04/22 1137)  BP: 117/67 (05/04/22 1137)  SpO2: 95 % (05/04/22 1137) Vital Signs (24h Range):  Temp:  [97.7 °F (36.5 °C)-98.8 °F (37.1 °C)] 98.4 °F (36.9 °C)  Pulse:  [50-69] 69  Resp:  [17-20] 17  SpO2:  [95 %-100 %] 95 %  BP: ()/(51-80) 117/67     Weight: (P) 63.5 kg (139 lb 15.9 oz)  Body mass index is 24.03 kg/m² (pended).     SpO2: 95 %  O2 Device (Oxygen Therapy): nasal cannula      Intake/Output Summary (Last 24 hours) at 5/4/2022 1418  Last data filed at 5/4/2022 1328  Gross per 24 hour   Intake 720 ml   Output 1250 ml   Net -530 ml       Lines/Drains/Airways       Drain  Duration             Female External Urinary Catheter 05/03/22 0000 1 day              Peripheral Intravenous Line  Duration                  Peripheral IV - Single Lumen 05/03/22 1115 22 G Left Hand 1 day         Peripheral IV - Single Lumen 05/03/22 1130 20 G Left Antecubital 1 day                    Physical Exam    Significant Labs: BMP:   Recent Labs   Lab 05/03/22  1130 05/03/22 2045 05/04/22  0425   CO2 20* 23 25   BUN 9.0* 13.1 15.9   CREATININE 0.67 1.14* 1.13*   CALCIUM 6.2* 9.4 9.1   , CMP   Recent Labs   Lab 05/03/22  1130 05/03/22 2045 05/04/22  0425   CO2 20* 23 25   BUN 9.0* 13.1 15.9   CREATININE 0.67 1.14* 1.13*   CALCIUM 6.2* 9.4 9.1   ALBUMIN 2.5* 3.8 3.8   AST 16  --  25   ALT 13  --  25   , CBC   Recent Labs   Lab 05/03/22  1130 05/04/22  0425   WBC 11.0 19.8*   HGB 11.1* 14.7   HCT 35.5* 44.6   , Lipid Panel No results for input(s): CHOL, HDL, LDLCALC, TRIG, CHOLHDL in the last 48 hours., Troponin   Recent Labs   Lab 05/03/22  1800 05/03/22  2045 05/04/22  0427    TROPONINI 0.602* 0.649* 0.315*   , and   Recent Lab Results  (Last 5 results in the past 24 hours)        05/04/22  0425   05/03/22  2333   05/03/22  2315   05/03/22  2216   05/03/22  2200        Abs Lymph 1.15               Abs Neutro 17.8               Albumin 3.8               Albumin/Globulin Ratio 1.2               Alkaline Phosphatase 70               ALT 25               AST 25               Baso # 0.03               Basophil % 0.2               Bilirubin, Direct 0.2               Bilirubin, Indirect 0.10               BILIRUBIN TOTAL 0.3               BUN 15.9               Calcium 9.1               Chloride 109               CO2 25                              CPK MB 2.4               Creatinine 1.13               eGFR if  >60               eGFR if non  50               Eos # 0.00               Eosinophil % 0.0               Globulin, Total 3.1               Glucose 140               Hematocrit 44.6               Hemoglobin 14.7               Immature Grans (Abs) 0.11               Immature Granulocytes 0.6               Lymph Auto 5.8               Magnesium 2.20               MCH 31.3               MCHC 33.0               MCV 95.1               Mono # 0.76               Mono % 3.8               MPV 10.5               Neutrophils Relative 89.6               nRBC 0.0               Platelets 237               POC ACTIVATED CLOTTING TIME K     130           POC Glucose         288       POCT Glucose   238     288         Potassium 4.9               PROTEIN TOTAL 6.9               RBC 4.69               RDW 13.7               Sample     unknown           Sodium 143               Troponin I 0.315               WBC 19.8                                      Significant Imaging: EKG: NSR, No ischemic changes and X-Ray: CXR: X-Ray Chest 1 View (CXR):   Results for orders placed or performed during the hospital encounter of 05/03/22   X-Ray Chest 1 View    Narrative     EXAMINATION:  XR CHEST 1 VIEW    CLINICAL HISTORY:  Shortness of breath    COMPARISON:  None available.    FINDINGS:  Cardiopericardial silhouette is within normal limits. Lungs are without dense focal or segmental consolidation, congestive process, pleural effusions or pneumothorax.      Impression    NO ACUTE CARDIOPULMONARY PROCESS IDENTIFIED.      Electronically signed by: Sam Day  Date:    05/03/2022  Time:    13:16

## 2022-05-04 NOTE — HOSPITAL COURSE
No clinical changes other than 'feeling back to normal and wanting to go home to care for her 5 grandkids'.  Denies cp/SOB.

## 2022-05-05 LAB — POCT GLUCOSE: 156 MG/DL (ref 70–110)

## 2022-05-11 NOTE — PHYSICIAN QUERY
PT Name: Dian Malik  MR #: [id number]     DOCUMENTATION CLARIFICATION      CDS/: Angelic Najera RN              Contact information: [email or web address]  This form is a permanent document in the medical record.    Query Date: May 11, 2022    By submitting this query, we are merely seeking further clarification of documentation to reflect the severity of illness of your patient. Please utilize your independent clinical judgment when addressing the question(s) below.     The Medical Record contains the following:   Indicators   Supporting Clinical Findings Location in Medical Record   x Chest Pain, Angina  During the angiogram, she developed severe cp, SOB and ischemic EKG changes as well as hypotension and bradycardia.      Feeling back to normal.  She did have some episode of chest pain last night but no more this morning.  She is feeling well this morning.  Her troponins have trended down. Consult 5/3       Cardiology      Progress Note 5/4       Hospital Medicine   x Coronary Artery Disease CAD seems stable based on the results of the abbreviated coronary angio earlier yesterday - also clinically stable.   Progress Note 5/4       Cardiology   x EKG Normal sinus rhythm  Normal ECG   EKG 5/3   x Troponin Troponin I 0.089 (H) 0.602 (H) 0.649 (H) 0.315 (H)       Lab 5/3, 5/3, 5/3, 5/4    Echo Results      Angiography     x Documentation of acute cardiac condition Elevated Troponin, Likely NSTEMI type II          Non STEMI type 1 versus coronary spasm H&P 5/3       Hospital Medicine        Discharge Summary 5/4       Hospital Medicine      Medication/Treatment     x Other While undergoing LHC at the out-patient cath lab, it appeared that  patient developed a severe contrast dye allergy where coronary flow drastically slowed down after the first couple injections of contrast during the angiogram.  She has a history of contrast dye allergy and was premedicated overnight and today for the  procedure.  During  the angiogram, she developed severe cp, SOB and ischemic EKG changes as well as hypotension and bradycardia.  She was quickly resuscitated using NTG, Epi and Atropine Consult 5/3       Cardiology      Provider, please clarify the NSTEMI diagnosis related to the above documentation:    [   ] NSTEMI     [ x  ] NSTEMI/Myocardial Infarction Type 2 due to (please specify): __________     [   ] Other Cardiac Diagnosis (please specify): _______________     [   ] Clinically Undetermined           Please document in your progress notes daily for the duration of treatment until resolved, and include in your discharge summary.  Form No. [id number]